# Patient Record
Sex: FEMALE | HISPANIC OR LATINO | Employment: FULL TIME | URBAN - METROPOLITAN AREA
[De-identification: names, ages, dates, MRNs, and addresses within clinical notes are randomized per-mention and may not be internally consistent; named-entity substitution may affect disease eponyms.]

---
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
## 2020-11-21 ENCOUNTER — HOSPITAL ENCOUNTER (INPATIENT)
Dept: HOSPITAL 8 - LDOP | Age: 20
LOS: 2 days | Discharge: HOME | End: 2020-11-23
Attending: OBSTETRICS & GYNECOLOGY | Admitting: OBSTETRICS & GYNECOLOGY
Payer: COMMERCIAL

## 2020-11-21 VITALS — HEIGHT: 69 IN | WEIGHT: 225.53 LBS | BODY MASS INDEX: 33.4 KG/M2

## 2020-11-21 VITALS — DIASTOLIC BLOOD PRESSURE: 82 MMHG | SYSTOLIC BLOOD PRESSURE: 119 MMHG

## 2020-11-21 VITALS — SYSTOLIC BLOOD PRESSURE: 119 MMHG | DIASTOLIC BLOOD PRESSURE: 82 MMHG

## 2020-11-21 VITALS — SYSTOLIC BLOOD PRESSURE: 110 MMHG | DIASTOLIC BLOOD PRESSURE: 74 MMHG

## 2020-11-21 VITALS — DIASTOLIC BLOOD PRESSURE: 75 MMHG | SYSTOLIC BLOOD PRESSURE: 118 MMHG

## 2020-11-21 DIAGNOSIS — D62: ICD-10-CM

## 2020-11-21 DIAGNOSIS — Z20.828: ICD-10-CM

## 2020-11-21 DIAGNOSIS — Z91.19: ICD-10-CM

## 2020-11-21 DIAGNOSIS — F12.90: ICD-10-CM

## 2020-11-21 DIAGNOSIS — Z3A.41: ICD-10-CM

## 2020-11-21 DIAGNOSIS — O48.0: Primary | ICD-10-CM

## 2020-11-21 LAB
BASOPHILS # BLD AUTO: 0 X10^3/UL (ref 0–0.3)
BASOPHILS NFR BLD AUTO: 0 % (ref 0–1)
EOSINOPHIL # BLD AUTO: 0 X10^3/UL (ref 0–0.8)
EOSINOPHIL NFR BLD AUTO: 0 % (ref 1–7)
ERYTHROCYTE [DISTWIDTH] IN BLOOD BY AUTOMATED COUNT: 17.3 % (ref 9.6–15.2)
LYMPHOCYTES # BLD AUTO: 2.1 X10^3/UL (ref 1–6.1)
LYMPHOCYTES NFR BLD AUTO: 15 % (ref 22–44)
MCH RBC QN AUTO: 18.4 PG (ref 27–34.8)
MCHC RBC AUTO-ENTMCNC: 30.6 G/DL (ref 32.4–35.8)
MD: NO
MONOCYTES # BLD AUTO: 1 X10^3/UL (ref 0–1.4)
MONOCYTES NFR BLD AUTO: 7 % (ref 2–9)
NEUTROPHILS # BLD AUTO: 10.9 X10^3/UL (ref 1.8–8)
NEUTROPHILS NFR BLD AUTO: 77 % (ref 42–75)
PLATELET # BLD AUTO: 276 X10^3/UL (ref 130–400)
PMV BLD AUTO: 8.9 FL (ref 7.4–10.4)
RBC # BLD AUTO: 5.04 X10^6/UL (ref 3.82–5.3)

## 2020-11-21 PROCEDURE — 86850 RBC ANTIBODY SCREEN: CPT

## 2020-11-21 PROCEDURE — 86592 SYPHILIS TEST NON-TREP QUAL: CPT

## 2020-11-21 PROCEDURE — 00HU33Z INSERTION OF INFUSION DEVICE INTO SPINAL CANAL, PERCUTANEOUS APPROACH: ICD-10-PCS | Performed by: OBSTETRICS & GYNECOLOGY

## 2020-11-21 PROCEDURE — 85025 COMPLETE CBC W/AUTO DIFF WBC: CPT

## 2020-11-21 PROCEDURE — 80307 DRUG TEST PRSMV CHEM ANLYZR: CPT

## 2020-11-21 PROCEDURE — 87635 SARS-COV-2 COVID-19 AMP PRB: CPT

## 2020-11-21 PROCEDURE — 36415 COLL VENOUS BLD VENIPUNCTURE: CPT

## 2020-11-21 PROCEDURE — 86900 BLOOD TYPING SEROLOGIC ABO: CPT

## 2020-11-21 PROCEDURE — 3E0R3BZ INTRODUCTION OF ANESTHETIC AGENT INTO SPINAL CANAL, PERCUTANEOUS APPROACH: ICD-10-PCS | Performed by: OBSTETRICS & GYNECOLOGY

## 2020-11-21 PROCEDURE — 0UQMXZZ REPAIR VULVA, EXTERNAL APPROACH: ICD-10-PCS | Performed by: OBSTETRICS & GYNECOLOGY

## 2020-11-21 RX ADMIN — SODIUM CHLORIDE, SODIUM LACTATE, POTASSIUM CHLORIDE, AND CALCIUM CHLORIDE SCH MLS/HR: .6; .31; .03; .02 INJECTION, SOLUTION INTRAVENOUS at 02:24

## 2020-11-21 RX ADMIN — AMPICILLIN SODIUM SCH MLS/HR: 1 INJECTION, POWDER, FOR SOLUTION INTRAMUSCULAR; INTRAVENOUS at 06:29

## 2020-11-21 RX ADMIN — AMPICILLIN SODIUM SCH MLS/HR: 1 INJECTION, POWDER, FOR SOLUTION INTRAMUSCULAR; INTRAVENOUS at 14:20

## 2020-11-21 RX ADMIN — SODIUM CHLORIDE, SODIUM LACTATE, POTASSIUM CHLORIDE, AND CALCIUM CHLORIDE SCH MLS/HR: .6; .31; .03; .02 INJECTION, SOLUTION INTRAVENOUS at 09:35

## 2020-11-21 RX ADMIN — Medication SCH MLS/HR: at 19:07

## 2020-11-21 RX ADMIN — FENTANYL CITRATE PRN MCG: 50 INJECTION INTRAMUSCULAR; INTRAVENOUS at 06:08

## 2020-11-21 RX ADMIN — FENTANYL CITRATE PRN MCG: 50 INJECTION INTRAMUSCULAR; INTRAVENOUS at 04:30

## 2020-11-21 RX ADMIN — AMPICILLIN SODIUM SCH MLS/HR: 1 INJECTION, POWDER, FOR SOLUTION INTRAMUSCULAR; INTRAVENOUS at 10:24

## 2020-11-21 RX ADMIN — SODIUM CHLORIDE, SODIUM LACTATE, POTASSIUM CHLORIDE, AND CALCIUM CHLORIDE SCH MLS/HR: .6; .31; .03; .02 INJECTION, SOLUTION INTRAVENOUS at 10:34

## 2020-11-21 RX ADMIN — SODIUM CHLORIDE, SODIUM LACTATE, POTASSIUM CHLORIDE, AND CALCIUM CHLORIDE SCH MLS/HR: .6; .31; .03; .02 INJECTION, SOLUTION INTRAVENOUS at 16:30

## 2020-11-21 RX ADMIN — AMPICILLIN SODIUM SCH MLS/HR: 1 INJECTION, POWDER, FOR SOLUTION INTRAMUSCULAR; INTRAVENOUS at 10:30

## 2020-11-22 VITALS — SYSTOLIC BLOOD PRESSURE: 119 MMHG | DIASTOLIC BLOOD PRESSURE: 82 MMHG

## 2020-11-22 VITALS — DIASTOLIC BLOOD PRESSURE: 76 MMHG | SYSTOLIC BLOOD PRESSURE: 113 MMHG

## 2020-11-22 VITALS — SYSTOLIC BLOOD PRESSURE: 111 MMHG | DIASTOLIC BLOOD PRESSURE: 70 MMHG

## 2020-11-22 VITALS — SYSTOLIC BLOOD PRESSURE: 108 MMHG | DIASTOLIC BLOOD PRESSURE: 69 MMHG

## 2020-11-22 LAB
BASOPHILS # BLD AUTO: 0 X10^3/UL (ref 0–0.3)
BASOPHILS # BLD AUTO: 0 X10^3/UL (ref 0–0.3)
BASOPHILS NFR BLD AUTO: 0 % (ref 0–1)
BASOPHILS NFR BLD AUTO: 0 % (ref 0–1)
EOSINOPHIL # BLD AUTO: 0 X10^3/UL (ref 0–0.8)
EOSINOPHIL # BLD AUTO: 0 X10^3/UL (ref 0–0.8)
EOSINOPHIL NFR BLD AUTO: 0 % (ref 1–7)
EOSINOPHIL NFR BLD AUTO: 0 % (ref 1–7)
ERYTHROCYTE [DISTWIDTH] IN BLOOD BY AUTOMATED COUNT: 17.4 % (ref 9.6–15.2)
ERYTHROCYTE [DISTWIDTH] IN BLOOD BY AUTOMATED COUNT: 17.4 % (ref 9.6–15.2)
LYMPHOCYTES # BLD AUTO: 2.1 X10^3/UL (ref 1–6.1)
LYMPHOCYTES # BLD AUTO: 2.3 X10^3/UL (ref 1–6.1)
LYMPHOCYTES NFR BLD AUTO: 11 % (ref 22–44)
LYMPHOCYTES NFR BLD AUTO: 12 % (ref 22–44)
MCH RBC QN AUTO: 18.6 PG (ref 27–34.8)
MCH RBC QN AUTO: 18.8 PG (ref 27–34.8)
MCHC RBC AUTO-ENTMCNC: 31.4 G/DL (ref 32.4–35.8)
MCHC RBC AUTO-ENTMCNC: 31.4 G/DL (ref 32.4–35.8)
MD: NO
MD: NO
MONOCYTES # BLD AUTO: 0.8 X10^3/UL (ref 0–1.4)
MONOCYTES # BLD AUTO: 1 X10^3/UL (ref 0–1.4)
MONOCYTES NFR BLD AUTO: 4 % (ref 2–9)
MONOCYTES NFR BLD AUTO: 5 % (ref 2–9)
NEUTROPHILS # BLD AUTO: 15.1 X10^3/UL (ref 1.8–8)
NEUTROPHILS # BLD AUTO: 18.4 X10^3/UL (ref 1.8–8)
NEUTROPHILS NFR BLD AUTO: 84 % (ref 42–75)
NEUTROPHILS NFR BLD AUTO: 85 % (ref 42–75)
PLATELET # BLD AUTO: 199 X10^3/UL (ref 130–400)
PLATELET # BLD AUTO: 247 X10^3/UL (ref 130–400)
PMV BLD AUTO: 8.7 FL (ref 7.4–10.4)
PMV BLD AUTO: 9 FL (ref 7.4–10.4)
RBC # BLD AUTO: 4.21 X10^6/UL (ref 3.82–5.3)
RBC # BLD AUTO: 4.22 X10^6/UL (ref 3.82–5.3)

## 2020-11-22 RX ADMIN — Medication SCH MLS/HR: at 03:52

## 2020-11-22 RX ADMIN — SODIUM CHLORIDE, SODIUM LACTATE, POTASSIUM CHLORIDE, AND CALCIUM CHLORIDE SCH MLS/HR: .6; .31; .03; .02 INJECTION, SOLUTION INTRAVENOUS at 00:30

## 2020-11-22 RX ADMIN — PRENATAL VIT W/ FE FUMARATE-FA TAB 27-0.8 MG SCH EACH: 27-0.8 TAB at 07:37

## 2020-11-22 RX ADMIN — Medication SCH MLS/HR: at 14:30

## 2020-11-22 RX ADMIN — SODIUM CHLORIDE, SODIUM LACTATE, POTASSIUM CHLORIDE, AND CALCIUM CHLORIDE SCH MLS/HR: .6; .31; .03; .02 INJECTION, SOLUTION INTRAVENOUS at 16:30

## 2020-11-22 RX ADMIN — SODIUM CHLORIDE, SODIUM LACTATE, POTASSIUM CHLORIDE, AND CALCIUM CHLORIDE SCH MLS/HR: .6; .31; .03; .02 INJECTION, SOLUTION INTRAVENOUS at 07:42

## 2020-11-22 RX ADMIN — DOCUSATE SODIUM PRN MG: 100 CAPSULE, LIQUID FILLED ORAL at 07:37

## 2020-11-23 VITALS — DIASTOLIC BLOOD PRESSURE: 80 MMHG | SYSTOLIC BLOOD PRESSURE: 116 MMHG

## 2020-11-23 RX ADMIN — DOCUSATE SODIUM PRN MG: 100 CAPSULE, LIQUID FILLED ORAL at 10:03

## 2020-11-23 RX ADMIN — PRENATAL VIT W/ FE FUMARATE-FA TAB 27-0.8 MG SCH EACH: 27-0.8 TAB at 07:45

## 2020-11-23 RX ADMIN — Medication SCH MLS/HR: at 00:30

## 2020-11-23 RX ADMIN — SODIUM CHLORIDE, SODIUM LACTATE, POTASSIUM CHLORIDE, AND CALCIUM CHLORIDE SCH MLS/HR: .6; .31; .03; .02 INJECTION, SOLUTION INTRAVENOUS at 08:30

## 2020-11-23 RX ADMIN — SODIUM CHLORIDE, SODIUM LACTATE, POTASSIUM CHLORIDE, AND CALCIUM CHLORIDE SCH MLS/HR: .6; .31; .03; .02 INJECTION, SOLUTION INTRAVENOUS at 00:30

## 2022-10-27 LAB
ABO GROUP BLD: NORMAL
BLD GP AB SCN SERPL QL: NEGATIVE
HBV SURFACE AG SERPL QL IA: NEGATIVE
HIV 1+2 AB+HIV1 P24 AG SERPL QL IA: NORMAL
RH BLD: POSITIVE
RUBV IGG SERPL IA-ACNC: NORMAL
TREPONEMA PALLIDUM IGG+IGM AB [PRESENCE] IN SERUM OR PLASMA BY IMMUNOASSAY: NORMAL

## 2023-01-07 LAB — GP B STREP DNA SPEC QL NAA+PROBE: NEGATIVE

## 2023-01-26 ENCOUNTER — HOSPITAL ENCOUNTER (INPATIENT)
Facility: MEDICAL CENTER | Age: 23
LOS: 2 days | End: 2023-01-28
Attending: OBSTETRICS & GYNECOLOGY | Admitting: OBSTETRICS & GYNECOLOGY
Payer: MEDICAID

## 2023-01-26 PROBLEM — O42.90 RUPTURE OF MEMBRANES WITH DELAY OF DELIVERY: Status: ACTIVE | Noted: 2023-01-26

## 2023-01-26 PROCEDURE — 770002 HCHG ROOM/CARE - OB PRIVATE (112)

## 2023-01-26 PROCEDURE — 86780 TREPONEMA PALLIDUM: CPT

## 2023-01-26 PROCEDURE — 36415 COLL VENOUS BLD VENIPUNCTURE: CPT

## 2023-01-26 PROCEDURE — 85025 COMPLETE CBC W/AUTO DIFF WBC: CPT

## 2023-01-26 PROCEDURE — 302449 STATCHG TRIAGE ONLY (STATISTIC)

## 2023-01-26 PROCEDURE — 700105 HCHG RX REV CODE 258: Performed by: OBSTETRICS & GYNECOLOGY

## 2023-01-26 PROCEDURE — 59025 FETAL NON-STRESS TEST: CPT

## 2023-01-26 RX ORDER — DEXTROSE, SODIUM CHLORIDE, SODIUM LACTATE, POTASSIUM CHLORIDE, AND CALCIUM CHLORIDE 5; .6; .31; .03; .02 G/100ML; G/100ML; G/100ML; G/100ML; G/100ML
INJECTION, SOLUTION INTRAVENOUS CONTINUOUS
Status: DISCONTINUED | OUTPATIENT
Start: 2023-01-27 | End: 2023-01-28 | Stop reason: HOSPADM

## 2023-01-26 RX ORDER — ROPIVACAINE HYDROCHLORIDE 2 MG/ML
INJECTION, SOLUTION EPIDURAL; INFILTRATION; PERINEURAL
Status: COMPLETED
Start: 2023-01-26 | End: 2023-01-27

## 2023-01-26 RX ORDER — MISOPROSTOL 200 UG/1
800 TABLET ORAL
Status: DISCONTINUED | OUTPATIENT
Start: 2023-01-26 | End: 2023-01-27 | Stop reason: HOSPADM

## 2023-01-26 RX ORDER — LIDOCAINE HYDROCHLORIDE 10 MG/ML
20 INJECTION, SOLUTION INFILTRATION; PERINEURAL
Status: DISCONTINUED | OUTPATIENT
Start: 2023-01-26 | End: 2023-01-27 | Stop reason: HOSPADM

## 2023-01-26 RX ORDER — ONDANSETRON 4 MG/1
4 TABLET, ORALLY DISINTEGRATING ORAL EVERY 6 HOURS PRN
Status: DISCONTINUED | OUTPATIENT
Start: 2023-01-26 | End: 2023-01-27 | Stop reason: HOSPADM

## 2023-01-26 RX ORDER — ALUMINA, MAGNESIA, AND SIMETHICONE 2400; 2400; 240 MG/30ML; MG/30ML; MG/30ML
30 SUSPENSION ORAL EVERY 6 HOURS PRN
Status: DISCONTINUED | OUTPATIENT
Start: 2023-01-26 | End: 2023-01-27 | Stop reason: HOSPADM

## 2023-01-26 RX ORDER — SODIUM CHLORIDE, SODIUM LACTATE, POTASSIUM CHLORIDE, CALCIUM CHLORIDE 600; 310; 30; 20 MG/100ML; MG/100ML; MG/100ML; MG/100ML
1000 INJECTION, SOLUTION INTRAVENOUS CONTINUOUS
Status: ACTIVE | OUTPATIENT
Start: 2023-01-26 | End: 2023-01-27

## 2023-01-26 RX ORDER — IBUPROFEN 800 MG/1
800 TABLET ORAL
Status: DISCONTINUED | OUTPATIENT
Start: 2023-01-26 | End: 2023-01-27 | Stop reason: HOSPADM

## 2023-01-26 RX ORDER — TERBUTALINE SULFATE 1 MG/ML
0.25 INJECTION, SOLUTION SUBCUTANEOUS
Status: DISCONTINUED | OUTPATIENT
Start: 2023-01-26 | End: 2023-01-27 | Stop reason: HOSPADM

## 2023-01-26 RX ORDER — ONDANSETRON 2 MG/ML
4 INJECTION INTRAMUSCULAR; INTRAVENOUS EVERY 6 HOURS PRN
Status: DISCONTINUED | OUTPATIENT
Start: 2023-01-26 | End: 2023-01-27 | Stop reason: HOSPADM

## 2023-01-26 RX ORDER — METHYLERGONOVINE MALEATE 0.2 MG/ML
0.2 INJECTION INTRAVENOUS
Status: DISCONTINUED | OUTPATIENT
Start: 2023-01-26 | End: 2023-01-27 | Stop reason: HOSPADM

## 2023-01-26 RX ORDER — ACETAMINOPHEN 500 MG
1000 TABLET ORAL
Status: DISCONTINUED | OUTPATIENT
Start: 2023-01-26 | End: 2023-01-27 | Stop reason: HOSPADM

## 2023-01-26 RX ORDER — OXYTOCIN 10 [USP'U]/ML
10 INJECTION, SOLUTION INTRAMUSCULAR; INTRAVENOUS
Status: DISCONTINUED | OUTPATIENT
Start: 2023-01-26 | End: 2023-01-27 | Stop reason: HOSPADM

## 2023-01-26 RX ADMIN — SODIUM CHLORIDE, POTASSIUM CHLORIDE, SODIUM LACTATE AND CALCIUM CHLORIDE 1000 ML: 600; 310; 30; 20 INJECTION, SOLUTION INTRAVENOUS at 23:04

## 2023-01-26 ASSESSMENT — PATIENT HEALTH QUESTIONNAIRE - PHQ9
1. LITTLE INTEREST OR PLEASURE IN DOING THINGS: NOT AT ALL
SUM OF ALL RESPONSES TO PHQ9 QUESTIONS 1 AND 2: 0
2. FEELING DOWN, DEPRESSED, IRRITABLE, OR HOPELESS: NOT AT ALL

## 2023-01-26 ASSESSMENT — LIFESTYLE VARIABLES
EVER_SMOKED: NEVER
ALCOHOL_USE: NO

## 2023-01-26 ASSESSMENT — PAIN DESCRIPTION - PAIN TYPE: TYPE: ACUTE PAIN

## 2023-01-27 ENCOUNTER — ANESTHESIA EVENT (OUTPATIENT)
Dept: ANESTHESIOLOGY | Facility: MEDICAL CENTER | Age: 23
End: 2023-01-27
Payer: MEDICAID

## 2023-01-27 ENCOUNTER — ANESTHESIA (OUTPATIENT)
Dept: ANESTHESIOLOGY | Facility: MEDICAL CENTER | Age: 23
End: 2023-01-27
Payer: MEDICAID

## 2023-01-27 LAB
BASOPHILS # BLD AUTO: 0.4 % (ref 0–1.8)
BASOPHILS # BLD: 0.05 K/UL (ref 0–0.12)
EOSINOPHIL # BLD AUTO: 0.06 K/UL (ref 0–0.51)
EOSINOPHIL NFR BLD: 0.4 % (ref 0–6.9)
ERYTHROCYTE [DISTWIDTH] IN BLOOD BY AUTOMATED COUNT: 38.9 FL (ref 35.9–50)
ERYTHROCYTE [DISTWIDTH] IN BLOOD BY AUTOMATED COUNT: 39.5 FL (ref 35.9–50)
HCT VFR BLD AUTO: 32.6 % (ref 37–47)
HCT VFR BLD AUTO: 34.4 % (ref 37–47)
HGB BLD-MCNC: 10 G/DL (ref 12–16)
HGB BLD-MCNC: 10.5 G/DL (ref 12–16)
HOLDING TUBE BB 8507: NORMAL
IMM GRANULOCYTES # BLD AUTO: 0.09 K/UL (ref 0–0.11)
IMM GRANULOCYTES NFR BLD AUTO: 0.6 % (ref 0–0.9)
LYMPHOCYTES # BLD AUTO: 2.59 K/UL (ref 1–4.8)
LYMPHOCYTES NFR BLD: 18.1 % (ref 22–41)
MCH RBC QN AUTO: 20.3 PG (ref 27–33)
MCH RBC QN AUTO: 20.5 PG (ref 27–33)
MCHC RBC AUTO-ENTMCNC: 30.5 G/DL (ref 33.6–35)
MCHC RBC AUTO-ENTMCNC: 30.7 G/DL (ref 33.6–35)
MCV RBC AUTO: 66.7 FL (ref 81.4–97.8)
MCV RBC AUTO: 66.8 FL (ref 81.4–97.8)
MONOCYTES # BLD AUTO: 0.92 K/UL (ref 0–0.85)
MONOCYTES NFR BLD AUTO: 6.4 % (ref 0–13.4)
NEUTROPHILS # BLD AUTO: 10.57 K/UL (ref 2–7.15)
NEUTROPHILS NFR BLD: 74.1 % (ref 44–72)
NRBC # BLD AUTO: 0 K/UL
NRBC BLD-RTO: 0 /100 WBC
PLATELET # BLD AUTO: 281 K/UL (ref 164–446)
PLATELET # BLD AUTO: 315 K/UL (ref 164–446)
PMV BLD AUTO: 11.1 FL (ref 9–12.9)
RBC # BLD AUTO: 4.88 M/UL (ref 4.2–5.4)
RBC # BLD AUTO: 5.16 M/UL (ref 4.2–5.4)
T PALLIDUM AB SER QL IA: NORMAL
WBC # BLD AUTO: 13.5 K/UL (ref 4.8–10.8)
WBC # BLD AUTO: 14.3 K/UL (ref 4.8–10.8)

## 2023-01-27 PROCEDURE — 36415 COLL VENOUS BLD VENIPUNCTURE: CPT

## 2023-01-27 PROCEDURE — 700111 HCHG RX REV CODE 636 W/ 250 OVERRIDE (IP): Performed by: OBSTETRICS & GYNECOLOGY

## 2023-01-27 PROCEDURE — 770002 HCHG ROOM/CARE - OB PRIVATE (112)

## 2023-01-27 PROCEDURE — 0HQ9XZZ REPAIR PERINEUM SKIN, EXTERNAL APPROACH: ICD-10-PCS | Performed by: OBSTETRICS & GYNECOLOGY

## 2023-01-27 PROCEDURE — 304965 HCHG RECOVERY SERVICES

## 2023-01-27 PROCEDURE — 303615 HCHG EPIDURAL/SPINAL ANESTHESIA FOR LABOR

## 2023-01-27 PROCEDURE — 85027 COMPLETE CBC AUTOMATED: CPT

## 2023-01-27 PROCEDURE — 59409 OBSTETRICAL CARE: CPT

## 2023-01-27 PROCEDURE — 01967 NEURAXL LBR ANES VAG DLVR: CPT | Performed by: ANESTHESIOLOGY

## 2023-01-27 PROCEDURE — 700105 HCHG RX REV CODE 258: Performed by: OBSTETRICS & GYNECOLOGY

## 2023-01-27 PROCEDURE — 700111 HCHG RX REV CODE 636 W/ 250 OVERRIDE (IP)

## 2023-01-27 PROCEDURE — 700111 HCHG RX REV CODE 636 W/ 250 OVERRIDE (IP): Performed by: ANESTHESIOLOGY

## 2023-01-27 RX ORDER — CALCIUM CARBONATE 500 MG/1
1000 TABLET, CHEWABLE ORAL EVERY 6 HOURS PRN
Status: DISCONTINUED | OUTPATIENT
Start: 2023-01-27 | End: 2023-01-28 | Stop reason: HOSPADM

## 2023-01-27 RX ORDER — SODIUM CHLORIDE, SODIUM LACTATE, POTASSIUM CHLORIDE, AND CALCIUM CHLORIDE .6; .31; .03; .02 G/100ML; G/100ML; G/100ML; G/100ML
250 INJECTION, SOLUTION INTRAVENOUS PRN
Status: DISCONTINUED | OUTPATIENT
Start: 2023-01-27 | End: 2023-01-27 | Stop reason: HOSPADM

## 2023-01-27 RX ORDER — IBUPROFEN 800 MG/1
800 TABLET ORAL EVERY 8 HOURS PRN
Status: DISCONTINUED | OUTPATIENT
Start: 2023-01-27 | End: 2023-01-28 | Stop reason: HOSPADM

## 2023-01-27 RX ORDER — SODIUM CHLORIDE, SODIUM LACTATE, POTASSIUM CHLORIDE, CALCIUM CHLORIDE 600; 310; 30; 20 MG/100ML; MG/100ML; MG/100ML; MG/100ML
INJECTION, SOLUTION INTRAVENOUS PRN
Status: DISCONTINUED | OUTPATIENT
Start: 2023-01-27 | End: 2023-01-28 | Stop reason: HOSPADM

## 2023-01-27 RX ORDER — SIMETHICONE 125 MG
125 TABLET,CHEWABLE ORAL 4 TIMES DAILY PRN
Status: DISCONTINUED | OUTPATIENT
Start: 2023-01-27 | End: 2023-01-28 | Stop reason: HOSPADM

## 2023-01-27 RX ORDER — SODIUM CHLORIDE, SODIUM LACTATE, POTASSIUM CHLORIDE, AND CALCIUM CHLORIDE .6; .31; .03; .02 G/100ML; G/100ML; G/100ML; G/100ML
1000 INJECTION, SOLUTION INTRAVENOUS
Status: DISCONTINUED | OUTPATIENT
Start: 2023-01-27 | End: 2023-01-27 | Stop reason: HOSPADM

## 2023-01-27 RX ORDER — ACETAMINOPHEN 500 MG
1000 TABLET ORAL EVERY 6 HOURS PRN
Status: DISCONTINUED | OUTPATIENT
Start: 2023-01-27 | End: 2023-01-28 | Stop reason: HOSPADM

## 2023-01-27 RX ORDER — DOCUSATE SODIUM 100 MG/1
100 CAPSULE, LIQUID FILLED ORAL 2 TIMES DAILY PRN
Status: DISCONTINUED | OUTPATIENT
Start: 2023-01-27 | End: 2023-01-28 | Stop reason: HOSPADM

## 2023-01-27 RX ORDER — BUPIVACAINE HYDROCHLORIDE 2.5 MG/ML
INJECTION, SOLUTION EPIDURAL; INFILTRATION; INTRACAUDAL PRN
Status: DISCONTINUED | OUTPATIENT
Start: 2023-01-27 | End: 2023-01-27 | Stop reason: SURG

## 2023-01-27 RX ORDER — ROPIVACAINE HYDROCHLORIDE 2 MG/ML
INJECTION, SOLUTION EPIDURAL; INFILTRATION; PERINEURAL CONTINUOUS
Status: DISCONTINUED | OUTPATIENT
Start: 2023-01-27 | End: 2023-01-28 | Stop reason: HOSPADM

## 2023-01-27 RX ORDER — BUPIVACAINE HYDROCHLORIDE 2.5 MG/ML
INJECTION, SOLUTION EPIDURAL; INFILTRATION; INTRACAUDAL
Status: COMPLETED
Start: 2023-01-27 | End: 2023-01-27

## 2023-01-27 RX ORDER — MISOPROSTOL 200 UG/1
600 TABLET ORAL
Status: DISCONTINUED | OUTPATIENT
Start: 2023-01-27 | End: 2023-01-28 | Stop reason: HOSPADM

## 2023-01-27 RX ADMIN — BUPIVACAINE HYDROCHLORIDE 2.5 ML: 2.5 INJECTION, SOLUTION EPIDURAL; INFILTRATION; INTRACAUDAL; PERINEURAL at 00:33

## 2023-01-27 RX ADMIN — ROPIVACAINE HYDROCHLORIDE 200 MG: 2 INJECTION, SOLUTION EPIDURAL; INFILTRATION at 00:42

## 2023-01-27 RX ADMIN — OXYTOCIN 125 ML/HR: 10 INJECTION, SOLUTION INTRAMUSCULAR; INTRAVENOUS at 03:49

## 2023-01-27 RX ADMIN — BUPIVACAINE HYDROCHLORIDE 2.5 ML: 2.5 INJECTION, SOLUTION EPIDURAL; INFILTRATION; INTRACAUDAL; PERINEURAL at 00:38

## 2023-01-27 RX ADMIN — ROPIVACAINE HYDROCHLORIDE 200 MG: 2 INJECTION, SOLUTION EPIDURAL; INFILTRATION; PERINEURAL at 00:42

## 2023-01-27 RX ADMIN — FENTANYL CITRATE 50 MCG: 50 INJECTION, SOLUTION INTRAMUSCULAR; INTRAVENOUS at 00:38

## 2023-01-27 RX ADMIN — FENTANYL CITRATE 50 MCG: 50 INJECTION, SOLUTION INTRAMUSCULAR; INTRAVENOUS at 00:33

## 2023-01-27 RX ADMIN — OXYTOCIN 20 UNITS: 10 INJECTION, SOLUTION INTRAMUSCULAR; INTRAVENOUS at 02:20

## 2023-01-27 ASSESSMENT — EDINBURGH POSTNATAL DEPRESSION SCALE (EPDS)
I HAVE BEEN SO UNHAPPY THAT I HAVE BEEN CRYING: NO, NEVER
I HAVE BLAMED MYSELF UNNECESSARILY WHEN THINGS WENT WRONG: NO, NEVER
I HAVE BEEN ANXIOUS OR WORRIED FOR NO GOOD REASON: NO, NOT AT ALL
I HAVE BEEN ABLE TO LAUGH AND SEE THE FUNNY SIDE OF THINGS: AS MUCH AS I ALWAYS COULD
I HAVE FELT SCARED OR PANICKY FOR NO GOOD REASON: NO, NOT AT ALL
THINGS HAVE BEEN GETTING ON TOP OF ME: NO, I HAVE BEEN COPING AS WELL AS EVER
I HAVE LOOKED FORWARD WITH ENJOYMENT TO THINGS: AS MUCH AS I EVER DID
THE THOUGHT OF HARMING MYSELF HAS OCCURRED TO ME: NEVER
I HAVE FELT SAD OR MISERABLE: NO, NOT AT ALL
I HAVE BEEN SO UNHAPPY THAT I HAVE HAD DIFFICULTY SLEEPING: NOT AT ALL

## 2023-01-27 ASSESSMENT — PAIN DESCRIPTION - PAIN TYPE
TYPE: ACUTE PAIN

## 2023-01-27 ASSESSMENT — PAIN SCALES - GENERAL: PAIN_LEVEL: 2

## 2023-01-27 NOTE — ANESTHESIA PROCEDURE NOTES
Epidural Block    Date/Time: 1/27/2023 12:24 AM  Performed by: Thomas Gerber M.D.  Authorized by: Thomas Gerber M.D.     Patient Location:  OB  Start Time:  1/27/2023 12:24 AM  End Time:  1/27/2023 12:33 AM  Reason for Block: labor analgesia    patient identified, IV checked, site marked, risks and benefits discussed, surgical consent, monitors and equipment checked, pre-op evaluation and timeout performed    Patient Position:  Sitting  Prep: ChloraPrep, patient draped and sterile technique    Monitoring:  Blood pressure, continuous pulse oximetry and heart rate  Approach:  Midline  Location:  L2-L3  Injection Technique:  STUART saline  Skin infiltration:  Lidocaine  Strength:  1%  Dose:  3ml  Needle Type:  Tuohy  Needle Gauge:  17 G  Needle Length:  3.5 in  Loss of resistance::  9  Catheter Size:  19 G  Catheter at Skin Depth:  17  Test Dose Result:  Negative   Success on 1st pass at 2nd level attempted  One pass at L3-4 was unsuccessful (thought there was STUART, but unable to thread catheter)  No heme/CSF  Catheter threaded easily  Negative aspiration  No evidence of complications  Patient comfortable after loading dose

## 2023-01-27 NOTE — ANESTHESIA TIME REPORT
Anesthesia Start and Stop Event Times     Date Time Event    1/27/2023 0018 Ready for Procedure     0024 Anesthesia Start     0216 Anesthesia Stop        Responsible Staff  01/27/23    Name Role Begin End    Thomas Gerber M.D. Anesth 0024 0216        Overtime Reason:  no overtime (within assigned shift)    Comments:

## 2023-01-27 NOTE — PROGRESS NOTES
Assumed care. Assessment completed. Fundus firm, lochia scant. Plan of care reviewed. Pt will call if med intervention needed. Call light within reach.

## 2023-01-27 NOTE — CARE PLAN
The patient is Stable - Low risk of patient condition declining or worsening    Shift Goals  Clinical Goals: Continue along labor curve for anticipated   Patient Goals: adequate pain control    Progress made toward(s) clinical / shift goals:    Problem: Knowledge Deficit - Postpartum  Goal: Patient will verbalize and demonstrate understanding of self and infant care  Outcome: Progressing     Problem: Psychosocial - Postpartum  Goal: Patient will verbalize and demonstrate effective bonding and parenting behavior  Outcome: Progressing     Problem: Altered Physiologic Condition  Goal: Patient physiologically stable as evidenced by normal lochia, palpable uterine involution and vitals within normal limits  Outcome: Progressing       Patient is not progressing towards the following goals:

## 2023-01-27 NOTE — CARE PLAN
The patient is Stable - Low risk of patient condition declining or worsening    Shift Goals  Clinical Goals: Pain management and establish feeding    Progress made toward(s) clinical / shift goals:      Problem: Psychosocial - Postpartum  Goal: Patient will verbalize and demonstrate effective bonding and parenting behavior  Outcome: Progressing  Pt interacting with infant appropriately with support of FOB at bedside.     Problem: Altered Physiologic Condition  Goal: Patient physiologically stable as evidenced by normal lochia, palpable uterine involution and vitals within normal limits  Outcome: Progressing  Pt maintaining firm fundus and lochia WDL.    Patient is not progressing towards the following goals:

## 2023-01-27 NOTE — ANESTHESIA POSTPROCEDURE EVALUATION
Patient: Silverio Goff    Procedure Summary     Date: 01/27/23 Room / Location:     Anesthesia Start: 0024 Anesthesia Stop: 0216    Procedure: Labor Epidural Diagnosis:     Scheduled Providers:  Responsible Provider: Thomas Gerber M.D.    Anesthesia Type: epidural ASA Status: 2          Final Anesthesia Type: epidural  Last vitals  BP   Blood Pressure: 113/63    Temp   36.6 °C (97.8 °F)    Pulse   82   Resp   18    SpO2   91 %      Anesthesia Post Evaluation    Patient location during evaluation: floor  Patient participation: complete - patient participated  Level of consciousness: awake and alert  Pain score: 2    Airway patency: patent  Anesthetic complications: no  Cardiovascular status: hemodynamically stable  Respiratory status: acceptable  Hydration status: euvolemic    PONV: none          No notable events documented.

## 2023-01-27 NOTE — PROGRESS NOTES
Patient transferred from [facility] in wheelchair, with infant in arms and FOB accompanying with belongings. Two RN verification or infant and parents armband. Report received from Esteban HOPSON RN , Patient oriented to unit, call light use, safety and POC. Fundus firm and palpable. Lochia light rubra. Educated importance of voiding. Second bag of Pit infusing @125ml/hr. IV patent, no sings of infiltration. Pt refusing pain meds at this time, reports 1/10 pain. Pt encouraged to call with any needs.     No

## 2023-01-27 NOTE — L&D DELIVERY NOTE
DATE OF SERVICE:  01/27/2023        This is a 22-year-old G2, P1-0-0-1 at 39 weeks and 4 days.  She is a patient   of Dr. June Cooney.  Her pregnancy has been uncomplicated.  She presented   complaining of leaking of fluid and contractions.  She was found to be 3-4 cm   on admission, 70% effaced, -2 station and she was grossly ruptured.  Her GBS   was noted to be negative.  She was admitted.  She received an epidural for   pain control.  Post-epidural, she was 8 cm.  She progressed to complete.  She   pushed for less than 20 minutes to go on to deliver a female infant from OA   presentation.  Delivery of the head was manual assisted, shoulders and the   rest of body followed without difficulty.  Baby was placed directly on mom's   abdomen with a vigorous cry and excellent tone.  Mouth and nares was bulb   suctioned.  The cord was doubly clamped and cut at approximately 30 seconds.    Placenta then delivered spontaneously and intact.  A 3-vessel cord was noted.    Uterus firmed up nicely with IV Pitocin and bimanual massage.  Inspection of   the vagina and perineum revealed one very superficial labial laceration that   was not bleeding and did not require suturing.  Otherwise, intact.     mL. Mom and baby are doing well.  Apgars were 8 and 9.  Sponge and needle   counts were correct.              ______________________________  MD YAZMIN BrownT/SUP    DD:  01/27/2023 02:34  DT:  01/27/2023 03:16    Job#:  [id number]

## 2023-01-27 NOTE — PROGRESS NOTES
9501 report received from Aliza RN, pt transferred to labor room. Admission procedures initiated, pt requesting epidural at this time.     6600 pt transferred to PPU via wheelchair w/ infant swaddled in arms. Report given to Glendy GUO, bands verified by both RN's. Care relinquished.

## 2023-01-27 NOTE — LACTATION NOTE
"This note was copied from a baby's chart.  Mom is a 21 y/o P2 who delivered baby girl weighing   7 # 15.3 oz at 39.4 wks. Mom reports she breast fed her first chil.d for  about 3 wks before milk \"went away\".  asked if mom needed any help  or support with breast feeding and mother declined. She stated she is breast feeding well and is combo feeding with bottles, however she has not offered a bottle at this time.   Mom breast fed baby on one side and about one hour ago. Baby was slightly fussy in bassinet and pacifier has been offered. LC encouraged  to avoid use of bottles and pacifiers if mom would like to breast feeding and increase her supply.    offered again to help feed baby but mom stated she was fine.   provided education on demand fees of 8 or more times. Mom has a pump at home and [facility] was in to enroll mother today.  "

## 2023-01-27 NOTE — H&P
"Obstetrics History and Physical    Date: 23    LLOYD: 2023.    Patient ID: Silverio Goff is a 22 y.o.  at 39w3d here with leaking fluid and contractions    Chief Complaint:   Chief Complaint   Patient presents with    Contractions     Srom at 2148    Rupture of Membranes       History of Presenting Illness: Pt of Dr. Cooney's; preg uncomplicated, declined genetic testing, normal anatomy scan, normal glucola, reports GBS negative; starting leaking some fluid and feeling painful contractions and presents here  Patient presented today with contractions since earlier this evening, also leaking large amount of fluid.           Review of systems: Pertinent items are noted in HPI.    Past Medical History:    No past medical history on file.    Past Surgical History:   No past surgical history on file.     Allergies:   No Known Allergies    Current Medications:  No current facility-administered medications on file prior to encounter.     No current outpatient medications on file prior to encounter.       Family History:   No family history on file.    Social History:       Obstetric History:  OB History    Para Term  AB Living   1 0 0 0 0 0   SAB IAB Ectopic Molar Multiple Live Births   0 0 0 0 0 0      # Outcome Date GA Lbr Josh/2nd Weight Sex Delivery Anes PTL Lv   1 Current              [facility] Care: [facility], Dr Cooney    Gynecologic History:  No LMP recorded. Patient is pregnant.  Menstrual cycles: regular   STDs: denies    Physical Exam:  Vitals:   Vitals:    23 2240   BP: 121/79   Pulse: 97   Resp: 18   Temp: 36.6 °C (97.8 °F)   TempSrc: Temporal   SpO2: 96%   Weight: 113 kg (250 lb)   Height: 1.753 m (5' 9\")     General:   Alert, conversational, pleasant, no acute distress  Lungs:   No respiratory distress, lungs clear to auscultation bilaterally   Heart:   Regular rate and rhythm   Abdomen:  Soft, gravid, non-tender, non-distended     Genitourinary:       SVE: 370/-3 " per RN     FETUS: EFW: 7.5 lbs by palpation     Fetal Heart Tracing:  Fetal Non-stress Test: reactive      Labs:  No results found for this or any previous visit (from the past 24 hour(s)).    Assessment:  Silverio Goff is a 22 y.o.  at 39w3d Estimated Date of Delivery: 2023    1. Single Live intrauterine pregnancy at 39 weeks  2. Early labor  3. Ruptured membranes  4. GBS negative          Plan:  - will admit  - epidural on demand  - FWB: gbs negative, reassuring monitoring        Ca Oconnor M.D.

## 2023-01-27 NOTE — PROGRESS NOTES
, female, OA  Placenta s&i with 3vc  Perineum intact  [id number]  Ap 8&9  No complications  Dict [id number]

## 2023-01-27 NOTE — ANESTHESIA PREPROCEDURE EVALUATION
Date: 23  Procedure: Labor Epidural        sutton pregnancy at 39+4 weeks gestation.    Relevant Problems   No relevant active problems       Physical Exam    Airway   Mallampati: II  TM distance: >3 FB  Neck ROM: full       Cardiovascular - normal exam  Rhythm: regular  Rate: normal  (-) murmur     Dental - normal exam           Pulmonary - normal exam  Breath sounds clear to auscultation     Abdominal    Neurological - normal exam                 Anesthesia Plan    ASA 2       Plan - epidural   Neuraxial block will be labor analgesia                  Pertinent diagnostic labs and testing reviewed    Informed Consent:    Anesthetic plan and risks discussed with patient.

## 2023-01-28 ENCOUNTER — PHARMACY VISIT (OUTPATIENT)
Dept: PHARMACY | Facility: MEDICAL CENTER | Age: 23
End: 2023-01-28
Payer: COMMERCIAL

## 2023-01-28 VITALS
TEMPERATURE: 97.3 F | BODY MASS INDEX: 37.03 KG/M2 | DIASTOLIC BLOOD PRESSURE: 72 MMHG | OXYGEN SATURATION: 97 % | SYSTOLIC BLOOD PRESSURE: 117 MMHG | RESPIRATION RATE: 18 BRPM | HEART RATE: 84 BPM | HEIGHT: 69 IN | WEIGHT: 250 LBS

## 2023-01-28 PROCEDURE — 700102 HCHG RX REV CODE 250 W/ 637 OVERRIDE(OP): Performed by: OBSTETRICS & GYNECOLOGY

## 2023-01-28 PROCEDURE — RXMED WILLOW AMBULATORY MEDICATION CHARGE: Performed by: OBSTETRICS & GYNECOLOGY

## 2023-01-28 PROCEDURE — A9270 NON-COVERED ITEM OR SERVICE: HCPCS | Performed by: OBSTETRICS & GYNECOLOGY

## 2023-01-28 RX ORDER — PSEUDOEPHEDRINE HCL 30 MG
100 TABLET ORAL 2 TIMES DAILY PRN
Qty: 60 CAPSULE | Refills: 0 | Status: SHIPPED | OUTPATIENT
Start: 2023-01-28

## 2023-01-28 RX ORDER — IBUPROFEN 800 MG/1
800 TABLET ORAL EVERY 8 HOURS PRN
Qty: 30 TABLET | Refills: 0 | Status: SHIPPED | OUTPATIENT
Start: 2023-01-28

## 2023-01-28 RX ADMIN — DOCUSATE SODIUM 100 MG: 100 CAPSULE, LIQUID FILLED ORAL at 07:42

## 2023-01-28 NOTE — DISCHARGE INSTRUCTIONS

## 2023-01-28 NOTE — PROGRESS NOTES
Assessment completed, Fundus is firm, lochia light, patient voiding normally. Pt ambulating independently. Pt has no complaints of pain. Bands verified matching baby. Needs are met at this time. Encouraged to call with any needs.

## 2023-01-28 NOTE — DISCHARGE SUMMARY
Discharge Summary:     Date of Admission: 2023  Date of Discharge: 23      Admitting diagnosis:    1. Pregnancy @ 39w4d        Discharge Diagnosis:   1. Status post spontaneous vaginal delivery.      Past medical history: None  Past surgical history: None      OB History    Para Term  AB Living   2 2 2     2   SAB IAB Ectopic Molar Multiple Live Births           0 2      # Outcome Date GA Lbr Josh/2nd Weight Sex Delivery Anes PTL Lv   2 Term 23 39w4d / 00:32 3.61 kg (7 lb 15.3 oz) F Vag-Spont EPI N JAYLAN   1 Term 2020    F   N JAYLAN       Patient has no known allergies.    Hospital Course:   Pt is a 22 y.o. now  who presented for loss of fluid and contractions.  Was admitted with early labor.  The patient progressed continuously without incident and underwent normal spontaneous vaginal delivery.  Patient gave birth to a viable female infant with Apgars of 8 and 9, weight 3610 g.  The patient's postpartum course was unremarkable.  She remained afebrile with normal vital signs.  On postpartum day #1 the patient felt ready for discharge.  On day of discharge pt was ambulating, voiding spontaneously, tolerating PO, with adequate pain control, and desiring to go home.    Physical Exam:  Temp:  [36.3 °C (97.3 °F)-37.2 °C (99 °F)] 36.3 °C (97.3 °F)  Pulse:  [84-94] 84  Resp:  [16-18] 18  BP: (113-122)/(72-84) 117/72  SpO2:  [97 %] 97 %  Gen: AAO, NAD  Abd: soft, NT, ND, fundus firm below umiblicus  Ext: NT, trace edema bilaterally    Current Facility-Administered Medications   Medication Dose    ropivacaine 0.2 % (NAROPIN) injection      lactated ringers infusion      docusate sodium (COLACE) capsule 100 mg  100 mg    ibuprofen (MOTRIN) tablet 800 mg  800 mg    acetaminophen (TYLENOL) tablet 1,000 mg  1,000 mg    tetanus-dipth-acell pertussis (Tdap) inj 0.5 mL  0.5 mL    PRN oxytocin (PITOCIN) (20 Units/1000 mL) PRN for excessive uterine bleeding - See Admin Instr  125-999 mL/hr     miSOPROStol (CYTOTEC) tablet 600 mcg  600 mcg    calcium carbonate (Tums) chewable tab 1,000 mg  1,000 mg    simethicone (Mylicon) chewable tablet 125 mg  125 mg    oxytocin (PITOCIN) infusion (for post delivery)  125 mL/hr    D5LR infusion         Recent Labs     01/26/23  2348 01/27/23  1353   WBC 14.3* 13.5*   RBC 5.16 4.88   HEMOGLOBIN 10.5* 10.0*   HEMATOCRIT 34.4* 32.6*   MCV 66.7* 66.8*   MCH 20.3* 20.5*   MCHC 30.5* 30.7*   RDW 39.5 38.9   PLATELETCT 315 281   MPV  --  11.1           Activity/ Discharge Instructions::   Discharge to home  Pelvic Rest x 6 weeks  No heavy lifting x4 weeks  Call or come to ED for: heavy vaginal bleeding, fever >100.4, severe abdominal pain, severe headache, chest pain, shortness of breath,  N/V, abnormal vaginal discharge, or other concerns.       Follow up:  5-6wks for PP visit w/ Dr. Cooney    Discharge Meds:      Medication List        START taking these medications        Instructions   docusate sodium 100 MG Caps   Take 100 mg by mouth 2 times a day as needed for Constipation.  Dose: 100 mg     ibuprofen 800 MG Tabs  Commonly known as: MOTRIN   Take 1 Tablet by mouth every 8 hours as needed for Moderate Pain.  Dose: 800 mg            CONTINUE taking these medications        Instructions   multivitamin Tabs   Take 1 Tablet by mouth every day.  Dose: 1 Tablet                 Nae Steele MD

## 2023-01-28 NOTE — CARE PLAN
The patient is Stable - Low risk of patient condition declining or worsening    Shift Goals  Clinical Goals: Feed baby q3 hours    Progress made toward(s) clinical / shift goals:      Problem: Psychosocial - Postpartum  Goal: Patient will verbalize and demonstrate effective bonding and parenting behavior  Outcome: Progressing  Pt interacting with infant appropriately and feeding independently.     Problem: Altered Physiologic Condition  Goal: Patient physiologically stable as evidenced by normal lochia, palpable uterine involution and vitals within normal limits  Outcome: Progressing  Pt maintaining firm fundus and lochia WDL.    Patient is not progressing towards the following goals:

## 2023-01-28 NOTE — CARE PLAN
The patient is Stable - Low risk of patient condition declining or worsening    Shift Goals  Clinical Goals: feed baby Q3, rest  Patient Goals: shower  Family Goals: support    Progress made toward(s) clinical / shift goals:    Problem: Knowledge Deficit - Postpartum  Goal: Patient will verbalize and demonstrate understanding of self and infant care  Outcome: Progressing     Problem: Psychosocial - Postpartum  Goal: Patient will verbalize and demonstrate effective bonding and parenting behavior  Outcome: Progressing     Problem: Altered Physiologic Condition  Goal: Patient physiologically stable as evidenced by normal lochia, palpable uterine involution and vitals within normal limits  Outcome: Progressing       Patient is not progressing towards the following goals:

## 2023-01-28 NOTE — PROGRESS NOTES
0945 - Discharge paperwork for pt and infant discussed with parents at bedside. All questions answered. Follow up appointment to be made by patient. NBS #2 dates given to parents. Parents verbalize understanding. Paperwork signed and dated at this time.     1015 - Infant discharged in car seat with parents. Infant placed in car seat by parents and checked by RN. Bands verified. Cuddles removed.